# Patient Record
Sex: MALE | URBAN - METROPOLITAN AREA
[De-identification: names, ages, dates, MRNs, and addresses within clinical notes are randomized per-mention and may not be internally consistent; named-entity substitution may affect disease eponyms.]

---

## 2023-04-06 ENCOUNTER — OFFICE VISIT (OUTPATIENT)
Dept: URBAN - METROPOLITAN AREA CLINIC 6 | Facility: CLINIC | Age: 63
End: 2023-04-06
Payer: MEDICARE

## 2023-04-06 DIAGNOSIS — H43.812 VITREOUS DEGENERATION, LEFT EYE: ICD-10-CM

## 2023-04-06 DIAGNOSIS — H53.2 DIPLOPIA: ICD-10-CM

## 2023-04-06 DIAGNOSIS — H25.813 COMBINED FORMS OF AGE-RELATED CATARACT, BILATERAL: Primary | ICD-10-CM

## 2023-04-06 DIAGNOSIS — H04.123 TEAR FILM INSUFFICIENCY OF BILATERAL LACRIMAL GLANDS: ICD-10-CM

## 2023-04-06 DIAGNOSIS — H43.391 OTHER VITREOUS OPACITIES, RIGHT EYE: ICD-10-CM

## 2023-04-06 PROCEDURE — 92014 COMPRE OPH EXAM EST PT 1/>: CPT | Performed by: OPTOMETRIST

## 2023-04-06 ASSESSMENT — INTRAOCULAR PRESSURE
OS: 11
OD: 14

## 2023-04-06 NOTE — IMPRESSION/PLAN
Impression: Other vitreous opacities, right eye: H43.391. Plan: Vitreous floaters/opacities right eye- reviewed the signs and symptoms of possible retinal detachment (flashes, floaters, change in peripheral vision, etc). Advised to contact us immediately for any of these or other new symptoms.

## 2023-04-06 NOTE — IMPRESSION/PLAN
Impression: Diplopia: H53.2. Plan: Diplopia- horizontal s/p thyroid removal due to thyroid cancer-in remission. Referred to Dr Orquidea Horn.